# Patient Record
Sex: MALE | Race: WHITE | NOT HISPANIC OR LATINO | Employment: FULL TIME | URBAN - METROPOLITAN AREA
[De-identification: names, ages, dates, MRNs, and addresses within clinical notes are randomized per-mention and may not be internally consistent; named-entity substitution may affect disease eponyms.]

---

## 2017-01-26 DIAGNOSIS — F90.0 ATTENTION DEFICIT HYPERACTIVITY DISORDER (ADHD), PREDOMINANTLY INATTENTIVE TYPE: Primary | ICD-10-CM

## 2017-01-26 DIAGNOSIS — F41.9 ANXIETY: ICD-10-CM

## 2017-01-26 NOTE — TELEPHONE ENCOUNTER
Adderall      Last Written Prescription Date: 12/30/16  Last Fill Quantity: 30,  # refills: 0   Last Office Visit with [facility] or [facility] prescribing provider: 4/11/16                                             Lorazepam      Last Written Prescription Date: 12/30/16  Last Fill Quantity: 20,  # refills: 0   Last Office Visit with [facility], [facility] or [facility] prescribing provider: 4/11/16

## 2017-01-27 RX ORDER — DEXTROAMPHETAMINE SACCHARATE, AMPHETAMINE ASPARTATE MONOHYDRATE, DEXTROAMPHETAMINE SULFATE AND AMPHETAMINE SULFATE 7.5; 7.5; 7.5; 7.5 MG/1; MG/1; MG/1; MG/1
30 CAPSULE, EXTENDED RELEASE ORAL DAILY
Qty: 30 CAPSULE | Refills: 0 | Status: SHIPPED | OUTPATIENT
Start: 2017-01-27 | End: 2017-05-03

## 2017-01-27 RX ORDER — LORAZEPAM 0.5 MG/1
0.5 TABLET ORAL
Qty: 20 TABLET | Refills: 0 | Status: SHIPPED | OUTPATIENT
Start: 2017-01-27 | End: 2017-05-03

## 2017-02-27 DIAGNOSIS — F90.0 ATTENTION DEFICIT HYPERACTIVITY DISORDER (ADHD), PREDOMINANTLY INATTENTIVE TYPE: ICD-10-CM

## 2017-02-27 DIAGNOSIS — F41.9 ANXIETY: ICD-10-CM

## 2017-02-28 NOTE — TELEPHONE ENCOUNTER
Adderall XR 30mg      Last Written Prescription Date: 1/27/2017  Last Fill Quantity: 30,  # refills: 0   Last Office Visit with [facility], [facility] or [facility] prescribing provider: 4/11/2016    Please bring signed prescription to [facility] if approved.    Thank you,  Kristin Don, [facility]

## 2017-02-28 NOTE — TELEPHONE ENCOUNTER
Lorazepam 0.5mg      Last Written Prescription Date: 1/27/2017  Last Fill Quantity: 20,  # refills: 0   Last Office Visit with [facility], [facility] or [facility] prescribing provider: 4/11/2016    Please fax or bring signed prescription to [facility].    Thank you,  Kristin Don [facility]

## 2017-03-01 ENCOUNTER — TELEPHONE (OUTPATIENT)
Dept: FAMILY MEDICINE | Facility: CLINIC | Age: 27
End: 2017-03-01

## 2017-03-01 PROBLEM — Z91.148 VIOLATION OF CONTROLLED SUBSTANCE AGREEMENT: Status: ACTIVE | Noted: 2017-03-01

## 2017-03-01 RX ORDER — LORAZEPAM 0.5 MG/1
0.5 TABLET ORAL
Qty: 20 TABLET | Refills: 0 | OUTPATIENT
Start: 2017-03-01

## 2017-03-01 RX ORDER — DEXTROAMPHETAMINE SACCHARATE, AMPHETAMINE ASPARTATE MONOHYDRATE, DEXTROAMPHETAMINE SULFATE AND AMPHETAMINE SULFATE 7.5; 7.5; 7.5; 7.5 MG/1; MG/1; MG/1; MG/1
30 CAPSULE, EXTENDED RELEASE ORAL DAILY
Qty: 30 CAPSULE | Refills: 0 | OUTPATIENT
Start: 2017-03-01

## 2017-03-01 NOTE — TELEPHONE ENCOUNTER
Left VM for patient to call back to notify him that Dr. Peterson will not be filling any controlled substances for him due to his recent snf stay for illegal sale of narcotics and illicit drugs. KB/CMA

## 2017-05-03 ENCOUNTER — OFFICE VISIT (OUTPATIENT)
Dept: FAMILY MEDICINE | Facility: CLINIC | Age: 27
End: 2017-05-03
Payer: COMMERCIAL

## 2017-05-03 VITALS
HEIGHT: 72 IN | TEMPERATURE: 97.3 F | WEIGHT: 206 LBS | DIASTOLIC BLOOD PRESSURE: 72 MMHG | SYSTOLIC BLOOD PRESSURE: 122 MMHG | HEART RATE: 87 BPM | RESPIRATION RATE: 18 BRPM | OXYGEN SATURATION: 99 % | BODY MASS INDEX: 27.9 KG/M2

## 2017-05-03 DIAGNOSIS — F90.0 ATTENTION DEFICIT HYPERACTIVITY DISORDER (ADHD), PREDOMINANTLY INATTENTIVE TYPE: Primary | ICD-10-CM

## 2017-05-03 PROCEDURE — 99213 OFFICE O/P EST LOW 20 MIN: CPT | Performed by: FAMILY MEDICINE

## 2017-05-03 RX ORDER — ATOMOXETINE 40 MG/1
40 CAPSULE ORAL DAILY
Qty: 30 CAPSULE | Refills: 1 | Status: SHIPPED | OUTPATIENT
Start: 2017-05-03 | End: 2024-06-11

## 2017-05-03 ASSESSMENT — PAIN SCALES - GENERAL: PAINLEVEL: NO PAIN (0)

## 2017-05-03 NOTE — PROGRESS NOTES
SUBJECTIVE:                                                    Jeffery Red is a 27 year old male who presents to clinic today for the following health issues:      ADHD     Amount of exercise or physical activity: 6-7 days/week for an average of 30-45 minutes    Problems taking medications regularly: No    Medication side effects: none    Diet: regular (no restrictions)          Problem list and histories reviewed & adjusted, as indicated.  Additional history: as documented        Reviewed and updated as needed this visit by clinical staff       Reviewed and updated as needed this visit by Provider        SUBJECTIVE:  Jeffery  is a 27 year old male who presents for:  Getting back on something for his ADHD. He has been on something since his early teens. He has been off on occasion because he didn't have insurance. He was recently in prison for illegal selling narcotics. He is in a [employer] house right now. He is being evaluated in the coming weeks by [place]. It is felt he needs to get back on something and of course not anything that is controlled substance. He feels his main concern is not able to focus. States his mind is always reeling.    Past Medical History:   Diagnosis Date     ADHD (attention deficit hyperactivity disorder)      Boxer's fracture     right 5th MC - pt did not follow up but it healed and he has good function     Metacarpal bone fracture 11/3/2011     Past Surgical History:   Procedure Laterality Date     IRRIGATION AND DEBRIDEMENT FINGER, COMBINED  11/4/2011    Procedure:COMBINED IRRIGATION AND DEBRIDEMENT FINGER; right middle finger irrigation and debridement; Surgeon:PHONG BRODERICK; Location: OR     Social History   Substance Use Topics     Smoking status: Current Every Day Smoker     Packs/day: 1.00     Smokeless tobacco: Not on file     Alcohol use No     Current Outpatient Prescriptions   Medication Sig Dispense Refill     atomoxetine (STRATTERA) 40 MG capsule Take 1 capsule  "(40 mg) by mouth daily 30 capsule 1     Acetaminophen (TYLENOL PO) Take 325 mg by mouth Reported on 5/3/2017       IBUPROFEN PO Take 600 mg by mouth Reported on 5/3/2017         REVIEW OF SYSTEMS:   5 point ROS negative except as noted above in HPI, including Gen., Resp, CV, GI &  system review.     OBJECTIVE:  Vitals: /72  Pulse 87  Temp 97.3  F (36.3  C) (Tympanic)  Resp 18  Ht 6' 0.25\" (1.835 m)  Wt 206 lb (93.4 kg)  SpO2 99%  BMI 27.75 kg/m2  BMI= Body mass index is 27.75 kg/(m^2).  He appears in no distress. Good eye contact seems a bit on edge.    ASSESSMENT:  ADHD    PLAN:  We'll start with Strattera 40 mg a day. Discussed potential side effects. Told him it may take a while to take effect. Should follow back in about a month.        Luciano Peterson MD  [facility]              "

## 2017-05-03 NOTE — MR AVS SNAPSHOT
"              After Visit Summary   5/3/2017    Jeffery Red    MRN: [id number]           Patient Information     Date Of Birth          1990        Visit Information        Provider Department      5/3/2017 1:00 PM Luciano Peterson MD [facility]        Today's Diagnoses     Attention deficit hyperactivity disorder (ADHD), predominantly inattentive type    -  1       Follow-ups after your visit        Who to contact     If you have questions or need follow up information about today's clinic visit or your schedule please contact [facility] directly at [phone].  Normal or non-critical lab and imaging results will be communicated to you by "[employer]hart, letter or phone within 4 business days after the clinic has received the results. If you do not hear from us within 7 days, please contact the clinic through "[employer]hart or phone. If you have a critical or abnormal lab result, we will notify you by phone as soon as possible.  Submit refill requests through [employer] or call your pharmacy and they will forward the refill request to us. Please allow 3 business days for your refill to be completed.          Additional Information About Your Visit        MyChart Information     [employer] lets you send messages to your doctor, view your test results, renew your prescriptions, schedule appointments and more. To sign up, go to [email or web address] . Click on \"Log in\" on the left side of the screen, which will take you to the Welcome page. Then click on \"Sign up Now\" on the right side of the page.     You will be asked to enter the access code listed below, as well as some personal information. Please follow the directions to create your username and password.     Your access code is: [email or web address]  Expires: 2017  1:25 PM     Your access code will  in 90 days. If you need help or a new code, please call your [facility] or [phone].        Care EveryWhere ID     This is your " "Care EveryWhere ID. This could be used by other organizations to access your [place] medical records  [id number]        Your Vitals Were     Pulse Temperature Respirations Height Pulse Oximetry BMI (Body Mass Index)    87 97.3  F (36.3  C) (Tympanic) 18 6' 0.25\" (1.835 m) 99% 27.75 kg/m2       Blood Pressure from Last 3 Encounters:   05/03/17 122/72   12/06/16 (!) 167/97   10/22/16 138/76    Weight from Last 3 Encounters:   05/03/17 206 lb (93.4 kg)   12/06/16 185 lb (83.9 kg)   04/11/16 207 lb (93.9 kg)              Today, you had the following     No orders found for display         Today's Medication Changes          These changes are accurate as of: 5/3/17  2:18 PM.  If you have any questions, ask your nurse or doctor.               Start taking these medicines.        Dose/Directions    atomoxetine 40 MG capsule   Commonly known as:  STRATTERA   Used for:  Attention deficit hyperactivity disorder (ADHD), predominantly inattentive type   Started by:  Luciano Peterson MD        Dose:  40 mg   Take 1 capsule (40 mg) by mouth daily   Quantity:  30 capsule   Refills:  1            Where to get your medicines      These medications were sent to [facility], [address] [place] [facility] [address] [facility] [address]     Phone:  [phone]     atomoxetine 40 MG capsule                Primary Care Provider Office Phone # Fax #    Luciano Peterson [phone]       [facility]   [facility] [place] [address]        Thank you!     Thank you for choosing [facility]  for your care. Our goal is always to provide you with excellent care. Hearing back from our patients is one way we can continue to improve our services. Please take a few minutes to complete the written survey that you may receive in the mail after your visit with us. Thank you!             Your Updated Medication List - Protect others around you: Learn how to safely " use, store and throw away your medicines at www.disposemymeds.org.          This list is accurate as of: 5/3/17  2:18 PM.  Always use your most recent med list.                   Brand Name Dispense Instructions for use    atomoxetine 40 MG capsule    STRATTERA    30 capsule    Take 1 capsule (40 mg) by mouth daily       IBUPROFEN PO      Take 600 mg by mouth Reported on 5/3/2017       TYLENOL PO      Take 325 mg by mouth Reported on 5/3/2017

## 2017-05-03 NOTE — NURSING NOTE
"Chief Complaint   Patient presents with     A.D.H.D     recheck meds        Initial /72  Pulse 87  Temp 97.3  F (36.3  C) (Tympanic)  Resp 18  Ht 6' 0.25\" (1.835 m)  Wt 206 lb (93.4 kg)  SpO2 99%  BMI 27.75 kg/m2 Estimated body mass index is 27.75 kg/(m^2) as calculated from the following:    Height as of this encounter: 6' 0.25\" (1.835 m).    Weight as of this encounter: 206 lb (93.4 kg).  BP completed using cuff size: shari Price MA      "

## 2018-02-21 ENCOUNTER — TELEPHONE (OUTPATIENT)
Dept: FAMILY MEDICINE | Facility: CLINIC | Age: 28
End: 2018-02-21

## 2018-02-21 NOTE — TELEPHONE ENCOUNTER
adderall and lorazepam - pt states been in retirement and needs these filled- didnt see anything on his med list

## 2018-02-21 NOTE — TELEPHONE ENCOUNTER
Per Dr. Peterson: He will not be prescribing these medications to the patient.  He had discussed this with the patient last spring when he had last seen him. The only thing he agreed to fill at that time was Strattera. Left  for return call. Please relay message to patient.

## 2018-02-21 NOTE — TELEPHONE ENCOUNTER
Patient called back and info was given.  Thank you,  Renu Crenshaw   for [facility]

## 2018-02-28 ENCOUNTER — OFFICE VISIT (OUTPATIENT)
Dept: FAMILY MEDICINE | Facility: CLINIC | Age: 28
End: 2018-02-28
Payer: MEDICAID

## 2018-02-28 VITALS
HEIGHT: 72 IN | BODY MASS INDEX: 27.21 KG/M2 | OXYGEN SATURATION: 97 % | TEMPERATURE: 98.6 F | WEIGHT: 200.9 LBS | DIASTOLIC BLOOD PRESSURE: 86 MMHG | SYSTOLIC BLOOD PRESSURE: 138 MMHG | HEART RATE: 105 BPM

## 2018-02-28 DIAGNOSIS — R41.840 POOR CONCENTRATION: ICD-10-CM

## 2018-02-28 DIAGNOSIS — F41.9 ANXIETY: Primary | ICD-10-CM

## 2018-02-28 PROCEDURE — 99213 OFFICE O/P EST LOW 20 MIN: CPT | Performed by: FAMILY MEDICINE

## 2018-02-28 RX ORDER — CITALOPRAM HYDROBROMIDE 20 MG/1
20 TABLET ORAL DAILY
Qty: 30 TABLET | Refills: 1 | Status: CANCELLED | OUTPATIENT
Start: 2018-02-28

## 2018-02-28 NOTE — MR AVS SNAPSHOT
After Visit Summary   2/28/2018    Jeffery Red    MRN: [id number]           Patient Information     Date Of Birth          1990        Visit Information        Provider Department      2/28/2018 2:20 PM Luciano Peterson MD [facility]        Today's Diagnoses     Anxiety    -  1    Poor concentration           Follow-ups after your visit        Additional Services     MENTAL HEALTH REFERRAL  - Adult; Assessments and Testing; General Psychological Testing; Other: [facility] [phone]; We will contact you to schedule the appointment or please call with any questions       ESME NAYLOR    All scheduling is subject to the client's specific insurance plan & benefits, provider/location availability, and provider clinical specialities.  Please arrive 15 minutes early for your first appointment and bring your completed paperwork.    Please be aware that coverage of these services is subject to the terms and limitations of your health insurance plan.  Call member services at your health plan with any benefit or coverage questions.                  Who to contact     If you have questions or need follow up information about today's clinic visit or your schedule please contact [facility] directly at [phone].  Normal or non-critical lab and imaging results will be communicated to you by [employer], letter or phone within 4 business days after the clinic has received the results. If you do not hear from us within 7 days, please contact the clinic through [employer] or phone. If you have a critical or abnormal lab result, we will notify you by phone as soon as possible.  Submit refill requests through [employer] or call your pharmacy and they will forward the refill request to us. Please allow 3 business days for your refill to be completed.          Additional Information About Your Visit        [employer] Information     [employer] lets you send  "messages to your doctor, view your test results, renew your prescriptions, schedule appointments and more. To sign up, go to [email or web address] . Click on \"Log in\" on the left side of the screen, which will take you to the Welcome page. Then click on \"Sign up Now\" on the right side of the page.     You will be asked to enter the access code listed below, as well as some personal information. Please follow the directions to create your username and password.     Your access code is: [email or web address]  Expires: 2018  3:00 PM     Your access code will  in 90 days. If you need help or a new code, please call your [place] clinic or [phone].        Care EveryWhere ID     This is your Care EveryWhere ID. This could be used by other organizations to access your [place] medical records  [id number]        Your Vitals Were     Pulse Temperature Height Pulse Oximetry BMI (Body Mass Index)       105 98.6  F (37  C) (Temporal) 6' 0.25\" (1.835 m) 97% 27.06 kg/m2        Blood Pressure from Last 3 Encounters:   18 138/86   17 122/72   16 (!) 167/97    Weight from Last 3 Encounters:   18 200 lb 14.4 oz (91.1 kg)   17 206 lb (93.4 kg)   16 185 lb (83.9 kg)              We Performed the Following     MENTAL HEALTH REFERRAL  - Adult; Assessments and Testing; General Psychological Testing; Other: [facility] [phone]; We will contact you to schedule the appointment or please call with any questions        Primary Care Provider Office Phone # Fax #    Luciano Peterson [phone]       [facility] [address] [place] [address]        Equal Access to Services     TIM MARTINEZ : Lane Rivas, pepper garcia, suhdeer dowling. So [facility] [phone].    ATENCIÓN: Si habla español, tiene a vargas disposición servicios gratuitos de asistencia lingüística. " Eliel galdamez [phone].    We comply with applicable federal civil rights laws and [place] laws. We do not discriminate on the basis of race, color, national origin, age, disability, sex, sexual orientation, or gender identity.            Thank you!     Thank you for choosing [facility]  for your care. Our goal is always to provide you with excellent care. Hearing back from our patients is one way we can continue to improve our services. Please take a few minutes to complete the written survey that you may receive in the mail after your visit with us. Thank you!             Your Updated Medication List - Protect others around you: Learn how to safely use, store and throw away your medicines at www.disposemymeds.org.          This list is accurate as of 2/28/18  3:00 PM.  Always use your most recent med list.                   Brand Name Dispense Instructions for use Diagnosis    atomoxetine 40 MG capsule    STRATTERA    30 capsule    Take 1 capsule (40 mg) by mouth daily    Attention deficit hyperactivity disorder (ADHD), predominantly inattentive type       IBUPROFEN PO      Take 600 mg by mouth Reported on 5/3/2017        TYLENOL PO      Take 325 mg by mouth Reported on 5/3/2017

## 2018-02-28 NOTE — PROGRESS NOTES
SUBJECTIVE:   Jeffery Red is a 28 year old male who presents to clinic today for the following health issues:      Concern - Restart adderall and lorazepam.             Problem list and histories reviewed & adjusted, as indicated.  Additional history: as documented        Reviewed and updated as needed this visit by clinical staff       Reviewed and updated as needed this visit by Provider          SUBJECTIVE:  Jeffery  is a 28 year old male who presents for: Concerns about ADHD.  He wants to get back on Adderall and he was on Lorazepam.  These were discontinued over a year ago.  He was imprisoned a year ago for sale of narcotics and illegal drugs.  States he was fighting this charge.  He was in a detention house again and they would not allow him to have this.  Last May I gave him Strattera.  Apparently this did not work I never saw him back.  He states he is had ADHD diagnosed when he was in high school.  He was just started on Adderall and do not believe he had a formal diagnosis done by mental health professional..    Past Medical History:   Diagnosis Date     ADHD (attention deficit hyperactivity disorder)      Boxer's fracture     right 5th MC - pt did not follow up but it healed and he has good function     Metacarpal bone fracture 11/3/2011     Past Surgical History:   Procedure Laterality Date     IRRIGATION AND DEBRIDEMENT FINGER, COMBINED  11/4/2011    Procedure:COMBINED IRRIGATION AND DEBRIDEMENT FINGER; right middle finger irrigation and debridement; Surgeon:PHONG BRODERICK; Location: OR     Social History   Substance Use Topics     Smoking status: Current Every Day Smoker     Packs/day: 0.75     Smokeless tobacco: Never Used     Alcohol use No     Current Outpatient Prescriptions   Medication Sig Dispense Refill     atomoxetine (STRATTERA) 40 MG capsule Take 1 capsule (40 mg) by mouth daily (Patient not taking: Reported on 2/28/2018) 30 capsule 1     Acetaminophen (TYLENOL PO) Take 325 mg by  "mouth Reported on 5/3/2017       IBUPROFEN PO Take 600 mg by mouth Reported on 5/3/2017         REVIEW OF SYSTEMS:   5 point ROS negative except as noted above in HPI, including Gen., Resp, CV, GI &  system review.     OBJECTIVE:  Vitals: /86 (BP Location: Right arm, Patient Position: Chair, Cuff Size: Adult Large)  Pulse 105  Temp 98.6  F (37  C) (Temporal)  Ht 6' 0.25\" (1.835 m)  Wt 200 lb 14.4 oz (91.1 kg)  SpO2 97%  BMI 27.06 kg/m2  BMI= Body mass index is 27.06 kg/(m^2).  He is alert and oriented.  Is somewhat disgruntled with our discussion.  States he needs something.    ASSESSMENT:  Anxiety    PLAN:  We had documentation from public records that he was charged with sale of narcotics and illegal substances from March 1 last year.  He was in [facility] [employer].  I pointed out to him that he has not been on anything for this for over a year.  As far as I know is not in any school right now but he states he has trouble concentrating.  I told him there are other medications for this and suggested maybe an SSRI to help with his anxiety.  He does not want to do this.  I suggested he get a formal workup to see just what it is that we are dealing with here.  He is agreeable to this.  I told him I am not comfortable prescribing controlled substances to someone with charges like this on his record.        Luciano Peterson MD  [facility]              "

## 2018-02-28 NOTE — NURSING NOTE
"Chief Complaint   Patient presents with     Medication Request     medication refill        Initial /86 (BP Location: Right arm, Patient Position: Chair, Cuff Size: Adult Large)  Pulse 105  Temp 98.6  F (37  C) (Temporal)  Ht 6' 0.25\" (1.835 m)  Wt 200 lb 14.4 oz (91.1 kg)  SpO2 97%  BMI 27.06 kg/m2 Estimated body mass index is 27.06 kg/(m^2) as calculated from the following:    Height as of this encounter: 6' 0.25\" (1.835 m).    Weight as of this encounter: 200 lb 14.4 oz (91.1 kg).  Medication Reconciliation: complete     "

## 2019-05-08 ENCOUNTER — APPOINTMENT (OUTPATIENT)
Dept: GENERAL RADIOLOGY | Facility: CLINIC | Age: 29
End: 2019-05-08
Attending: NURSE PRACTITIONER

## 2019-05-08 ENCOUNTER — HOSPITAL ENCOUNTER (EMERGENCY)
Facility: CLINIC | Age: 29
Discharge: HOME | End: 2019-05-08
Attending: NURSE PRACTITIONER | Admitting: NURSE PRACTITIONER

## 2019-05-08 VITALS
TEMPERATURE: 98.6 F | RESPIRATION RATE: 16 BRPM | WEIGHT: 230 LBS | DIASTOLIC BLOOD PRESSURE: 68 MMHG | BODY MASS INDEX: 30.98 KG/M2 | OXYGEN SATURATION: 97 % | HEART RATE: 77 BPM | SYSTOLIC BLOOD PRESSURE: 120 MMHG

## 2019-05-08 DIAGNOSIS — J11.1 INFLUENZA-LIKE ILLNESS: ICD-10-CM

## 2019-05-08 LAB
ALBUMIN SERPL-MCNC: 4.3 G/DL (ref 3.4–5)
ALP SERPL-CCNC: 123 U/L (ref 40–150)
ALT SERPL W P-5'-P-CCNC: 35 U/L (ref 0–70)
ANION GAP SERPL CALCULATED.3IONS-SCNC: 10 MMOL/L (ref 3–14)
AST SERPL W P-5'-P-CCNC: 22 U/L (ref 0–45)
BASOPHILS # BLD AUTO: 0 10E9/L (ref 0–0.2)
BASOPHILS NFR BLD AUTO: 0.1 %
BILIRUB SERPL-MCNC: 1 MG/DL (ref 0.2–1.3)
BUN SERPL-MCNC: 15 MG/DL (ref 7–30)
CALCIUM SERPL-MCNC: 9 MG/DL (ref 8.5–10.1)
CHLORIDE SERPL-SCNC: 108 MMOL/L (ref 94–109)
CO2 SERPL-SCNC: 23 MMOL/L (ref 20–32)
CREAT SERPL-MCNC: 0.87 MG/DL (ref 0.66–1.25)
DIFFERENTIAL METHOD BLD: ABNORMAL
EOSINOPHIL NFR BLD AUTO: 1.8 %
ERYTHROCYTE [DISTWIDTH] IN BLOOD BY AUTOMATED COUNT: 12.3 % (ref 10–15)
GFR SERPL CREATININE-BSD FRML MDRD: >90 ML/MIN/{1.73_M2}
GLUCOSE SERPL-MCNC: 87 MG/DL (ref 70–99)
HCT VFR BLD AUTO: 42.6 % (ref 40–53)
HGB BLD-MCNC: 14.9 G/DL (ref 13.3–17.7)
IMM GRANULOCYTES # BLD: 0.1 10E9/L (ref 0–0.4)
IMM GRANULOCYTES NFR BLD: 0.3 %
LACTATE BLD-SCNC: 0.8 MMOL/L (ref 0.7–2)
LYMPHOCYTES # BLD AUTO: 1.1 10E9/L (ref 0.8–5.3)
LYMPHOCYTES NFR BLD AUTO: 7 %
MCH RBC QN AUTO: 30.2 PG (ref 26.5–33)
MCHC RBC AUTO-ENTMCNC: 35 G/DL (ref 31.5–36.5)
MCV RBC AUTO: 86 FL (ref 78–100)
MONOCYTES # BLD AUTO: 1.2 10E9/L (ref 0–1.3)
MONOCYTES NFR BLD AUTO: 7.3 %
NEUTROPHILS # BLD AUTO: 13.2 10E9/L (ref 1.6–8.3)
NEUTROPHILS NFR BLD AUTO: 83.5 %
NRBC # BLD AUTO: 0 10*3/UL
NRBC BLD AUTO-RTO: 0 /100
PLATELET # BLD AUTO: 266 10E9/L (ref 150–450)
POTASSIUM SERPL-SCNC: 3.7 MMOL/L (ref 3.4–5.3)
PROT SERPL-MCNC: 7.7 G/DL (ref 6.8–8.8)
RBC # BLD AUTO: 4.93 10E12/L (ref 4.4–5.9)
SODIUM SERPL-SCNC: 141 MMOL/L (ref 133–144)
WBC # BLD AUTO: 15.9 10E9/L (ref 4–11)

## 2019-05-08 PROCEDURE — 25000128 H RX IP 250 OP 636: Performed by: NURSE PRACTITIONER

## 2019-05-08 PROCEDURE — 80053 COMPREHEN METABOLIC PANEL: CPT | Performed by: FAMILY MEDICINE

## 2019-05-08 PROCEDURE — 99284 EMERGENCY DEPT VISIT MOD MDM: CPT | Mod: 25 | Performed by: NURSE PRACTITIONER

## 2019-05-08 PROCEDURE — 85025 COMPLETE CBC W/AUTO DIFF WBC: CPT | Performed by: FAMILY MEDICINE

## 2019-05-08 PROCEDURE — 99284 EMERGENCY DEPT VISIT MOD MDM: CPT | Mod: Z6 | Performed by: NURSE PRACTITIONER

## 2019-05-08 PROCEDURE — 71046 X-RAY EXAM CHEST 2 VIEWS: CPT | Mod: TC

## 2019-05-08 PROCEDURE — 83605 ASSAY OF LACTIC ACID: CPT | Performed by: FAMILY MEDICINE

## 2019-05-08 PROCEDURE — 96374 THER/PROPH/DIAG INJ IV PUSH: CPT | Performed by: NURSE PRACTITIONER

## 2019-05-08 PROCEDURE — 96375 TX/PRO/DX INJ NEW DRUG ADDON: CPT | Performed by: NURSE PRACTITIONER

## 2019-05-08 RX ORDER — CODEINE PHOSPHATE AND GUAIFENESIN 10; 100 MG/5ML; MG/5ML
1-2 SOLUTION ORAL
Qty: 180 ML | Refills: 0 | Status: SHIPPED | OUTPATIENT
Start: 2019-05-08 | End: 2024-06-11

## 2019-05-08 RX ORDER — ONDANSETRON 2 MG/ML
4 INJECTION INTRAMUSCULAR; INTRAVENOUS EVERY 30 MIN PRN
Status: DISCONTINUED | OUTPATIENT
Start: 2019-05-08 | End: 2019-05-09 | Stop reason: HOSPADM

## 2019-05-08 RX ORDER — KETOROLAC TROMETHAMINE 30 MG/ML
30 INJECTION, SOLUTION INTRAMUSCULAR; INTRAVENOUS ONCE
Status: COMPLETED | OUTPATIENT
Start: 2019-05-08 | End: 2019-05-08

## 2019-05-08 RX ORDER — LIDOCAINE 40 MG/G
CREAM TOPICAL
Status: DISCONTINUED | OUTPATIENT
Start: 2019-05-08 | End: 2019-05-08

## 2019-05-08 RX ADMIN — SODIUM CHLORIDE 1000 ML: 9 INJECTION, SOLUTION INTRAVENOUS at 21:57

## 2019-05-08 RX ADMIN — ONDANSETRON 4 MG: 2 INJECTION INTRAMUSCULAR; INTRAVENOUS at 21:58

## 2019-05-08 RX ADMIN — KETOROLAC TROMETHAMINE 30 MG: 30 INJECTION, SOLUTION INTRAMUSCULAR at 22:01

## 2019-05-08 ASSESSMENT — ENCOUNTER SYMPTOMS
CHILLS: 1
ACTIVITY CHANGE: 1
FEVER: 1
MYALGIAS: 1
DIARRHEA: 1
FATIGUE: 1
NAUSEA: 1
COUGH: 1
APPETITE CHANGE: 1

## 2019-05-08 NOTE — ED AVS SNAPSHOT
[facility]  [address] [place] [address]  Phone:  [phone]  Fax:  [phone]                                    Jeffery Red   MRN: [id number]    Department:  [facility]   Date of Visit:  5/8/2019           After Visit Summary Signature Page    I have received my discharge instructions, and my questions have been answered. I have discussed any challenges I see with this plan with the nurse or doctor.    ..........................................................................................................................................  Patient/Patient Representative Signature      ..........................................................................................................................................  Patient Representative Print Name and Relationship to Patient    ..................................................               ................................................  Date                                   Time    ..........................................................................................................................................  Reviewed by Signature/Title    ...................................................              ..............................................  Date                                               Time          22EPIC Rev 08/18

## 2019-05-09 NOTE — ED PROVIDER NOTES
"  History     Chief Complaint   Patient presents with     Cough     Shortness of Breath     HPI  Jeffery Red is a 29 year old male who presents to the ED today with his mom with c/o a cough, lung pain, body aches, nausea, post-tussive emesis, non bloody diarrhea, and generally not feeling well since yesterday.  Patient has been taking Nyquil and Dayquil without much relief in his symptoms.  Patient has been drinking some water today but \"not enough\".  Patient has not been eating, is a smoker, did not get a flu shot this year.     Allergies:  Allergies   Allergen Reactions     Hydromorphone Hives     Facial swelling     Dilaudid [Hydromorphone Hcl]      Symptoms: itching, hives (all 4 extremities), swelling on face and under tongue. Occurred right after Dilaudid IV given-      probable histamine reaction due to narcotic per pharmacist (pt was given Zantac and Benadryl with resolution of symptoms in 10 min. Pharmacist reports not a true allergy with such a quick resolution of the symptoms after treated with Zantac and Benedryl)     Vancomycin Hives     Facial swelling       Problem List:    Patient Active Problem List    Diagnosis Date Noted     Health Care Home 05/01/2012     Priority: High     Patient Care Plan  Emergency Treatment Plan    Has the Patient been seen in the ED 4 or more times in the last 12 months : Yes    Recent Treatments: Please review Epic for recent treatments    Patient/PCP Contract: None    Patient Acknowledgement of Plan of Care: No     Presenting Problem Treatment Plan and Discharge Instructions   Multiple ED visits but UT for any outreaches/Post PCP appts Please note ED visits for bone fractures/injuries. Consider anger/behavior issues and Psyche consult or possibly chem dep?   Minimal contact info provided Please update     The provider seeing you for these problems in the emergency setting may determine that a different course of care is necessary based on the situation.    Team " Communication/Sticky Note: (Take items out when done)  Date Noted Care Team Member TO DO/Alerts to be completed                     Health care home/care coordination was discussed with the patient and he/she agrees to participate in care coordination. The patient was introduced to the care coordinator and given a patient packet to review and complete. The care coordinator will contact the patient to start care planning process.  Care coordination start date:      Frequency of care coordination with care team:  Care Coordinator Name  Contact information for updates to this care plan:  1.  Care Coordinator   Phone #   Fax #  2.  Clinic Unit Coordinator/   Phone #   Fax #  This care plan was discussed and reviewed with Jeffery Red on ____________.  Provider:  Care Coordinator:  Enid Branch RN    SELF MANAGEMENT PLAN  Presenting Problem Signs and Symptoms Treatment Plan                                   Advanced Care Directives:  not on file  Action Plans on File:  None  Short and Long Term Goals  Goals/Issues My Action Plan Person Responsible Time Frame/Date Completed                                             Jeffery Red   [facility] Rec #: [id number]   Health Insurance/Plan:   : 1990   ID: N/A   Primary Care Provider: No primary provider on file.       Date form completed: No visit date found.       Primary Ethnic Culture:  American   Primary Language:   English     needed? No Language: English   Name of preferred :   Phone #:   Preferred Mode of Communication: TBD   Preferred Method of Communication: TBD   Health Care Home Complexity Tier:         Contact Information:   Mother's Name: Suma Red   Father's Name: Data Unavailable   Phone: [phone] (home)    Preferred Contact   Address:    [facility] [address]  [place]   Siblings/Relatives:   Lives with:      My Story  I like to be called Jeffery  Health Maintenance/Preventative Procedures and  Immunizations are addressed in the Health Maintenance List and should be updated  Integrated Medical Plan    Additional Standing Lab Orders (Use Health Maintenance When Possible):      TBD    Therapies:  N/A    My Multidisciplinary Care Team Members  Specialty of Care Team Member Name, Clinic, Address, Phone #, Fax #, E-mail   Primary care provider: No primary provider on file.                          School:  N/A    Care Givers  Type of Care Giver Phone/Fax E-mail   PCA:        Home Health Agency:       Nurse Name:       :       Guardian:         Persons You Can Contact About Me and My Medical Care  Name Relation Phone/Fax E-mail DARIEN Date                                                     This care plan is a documentation of the individual s care as directed by the family, educators, therapists and diverse medical providers.  Contributors to the care plan include the patient, the patient s family and No primary provider on file. and the health care home team at [facility].  Please indicate any changes made to the care of this patient on this care plan and fax it to the care coordinator above.  Thank you for your attention.  Patient: Jeffery Red__________________  Parent:N/A______________________  No primary provider on file.___________________  May 1, 2012___________________      Enid Branch RN   Care Coordinator  [phone]  DX V65.8 REPLACED WITH [id number] HEALTH CARE HOME (04/08/2013)       Violation of controlled substance agreement 03/01/2017     Priority: Medium     Pt was in nursing home for selling his meds and [employer]  drugs       Anxiety 03/16/2016     Priority: Medium     Attention deficit hyperactivity disorder (ADHD), predominantly inattentive type 02/18/2016     Priority: Medium     CARDIOVASCULAR SCREENING; LDL GOAL LESS THAN 130 09/04/2012     Priority: Medium     Metacarpal bone fracture 11/03/2011     Priority: Medium     Right third metacarpal          Past Medical  History:    Past Medical History:   Diagnosis Date     ADHD (attention deficit hyperactivity disorder)      Boxer's fracture      Metacarpal bone fracture 11/3/2011       Past Surgical History:    Past Surgical History:   Procedure Laterality Date     IRRIGATION AND DEBRIDEMENT FINGER, COMBINED  11/4/2011    Procedure:COMBINED IRRIGATION AND DEBRIDEMENT FINGER; right middle finger irrigation and debridement; Surgeon:PHONG BRODERICK; Location:[facility] OR       Family History:    No family history on file.    Social History:  Marital Status:  Single [1]  Social History     Tobacco Use     Smoking status: Current Every Day Smoker     Packs/day: 0.75     Smokeless tobacco: Never Used   Substance Use Topics     Alcohol use: No     Drug use: No     Comment: Sober date 7/5/ 2014        Medications:      Acetaminophen (TYLENOL PO)   atomoxetine (STRATTERA) 40 MG capsule   IBUPROFEN PO         Review of Systems   Constitutional: Positive for activity change, appetite change, chills, fatigue and fever.   Respiratory: Positive for cough.    Gastrointestinal: Positive for diarrhea and nausea.   Musculoskeletal: Positive for myalgias.   All other systems reviewed and are negative.      Physical Exam   BP: (!) 125/96  Pulse: 101  Temp: 99  F (37.2  C)  Resp: 22  Weight: 104.3 kg (230 lb)  SpO2: 95 %      Physical Exam   Constitutional: He appears well-developed and well-nourished. No distress.   HENT:   Head: Normocephalic.   Modestly dry mucus membranes   Eyes: Conjunctivae and EOM are normal.   Neck: Normal range of motion. Neck supple.   Cardiovascular: Regular rhythm.   Mildly tachycardic   Pulmonary/Chest: Effort normal. No respiratory distress. He exhibits no tenderness.   Rhonchi RLL   Abdominal: Soft. Bowel sounds are normal.   Neurological: He is alert.   Skin: Capillary refill takes less than 2 seconds. He is not diaphoretic.   Hot to touch   Psychiatric: He has a normal mood and affect.       ED Course        Procedures        Results for orders placed or performed during the hospital encounter of 05/08/19 (from the past 24 hour(s))   Comprehensive metabolic panel   Result Value Ref Range    Sodium 141 133 - 144 mmol/L    Potassium 3.7 3.4 - 5.3 mmol/L    Chloride 108 94 - 109 mmol/L    Carbon Dioxide 23 20 - 32 mmol/L    Anion Gap 10 3 - 14 mmol/L    Glucose 87 70 - 99 mg/dL    Urea Nitrogen 15 7 - 30 mg/dL    Creatinine 0.87 0.66 - 1.25 mg/dL    GFR Estimate >90 >60 mL/min/[1.73_m2]    GFR Estimate If Black >90 >60 mL/min/[1.73_m2]    Calcium 9.0 8.5 - 10.1 mg/dL    Bilirubin Total 1.0 0.2 - 1.3 mg/dL    Albumin 4.3 3.4 - 5.0 g/dL    Protein Total 7.7 6.8 - 8.8 g/dL    Alkaline Phosphatase 123 40 - 150 U/L    ALT 35 0 - 70 U/L    AST 22 0 - 45 U/L   CBC with platelets differential   Result Value Ref Range    WBC 15.9 (H) 4.0 - 11.0 10e9/L    RBC Count 4.93 4.4 - 5.9 10e12/L    Hemoglobin 14.9 13.3 - 17.7 g/dL    Hematocrit 42.6 40.0 - 53.0 %    MCV 86 78 - 100 fl    MCH 30.2 26.5 - 33.0 pg    MCHC 35.0 31.5 - 36.5 g/dL    RDW 12.3 10.0 - 15.0 %    Platelet Count 266 150 - 450 10e9/L    Diff Method Automated Method     % Neutrophils 83.5 %    % Lymphocytes 7.0 %    % Monocytes 7.3 %    % Eosinophils 1.8 %    % Basophils 0.1 %    % Immature Granulocytes 0.3 %    Nucleated RBCs 0 0 /100    Absolute Neutrophil 13.2 (H) 1.6 - 8.3 10e9/L    Absolute Lymphocytes 1.1 0.8 - 5.3 10e9/L    Absolute Monocytes 1.2 0.0 - 1.3 10e9/L    Absolute Basophils 0.0 0.0 - 0.2 10e9/L    Abs Immature Granulocytes 0.1 0 - 0.4 10e9/L    Absolute Nucleated RBC 0.0    Lactate for Sepsis Protocol   Result Value Ref Range    Lactate for Sepsis Protocol 0.8 0.7 - 2.0 mmol/L   XR Chest 2 Views    Narrative    XR CHEST 2 VW 5/8/2019 9:42 PM     HISTORY: cough, fever      Impression    IMPRESSION: Negative exam.    HANSA RONQUILLO MD       Medications   sodium chloride (PF) 0.9% PF flush 3 mL (has no administration in time range)   sodium chloride (PF) 0.9% PF flush  3 mL (has no administration in time range)   ondansetron (ZOFRAN) injection 4 mg (4 mg Intravenous Given 5/8/19 2158)   0.9% sodium chloride BOLUS (1,000 mLs Intravenous New Bag 5/8/19 2157)   ketorolac (TORADOL) injection 30 mg (30 mg Intravenous Given 5/8/19 2201)       Assessments & Plan (with Medical Decision Making)  Influenza like illness  Patient feeling much better here after IV Toradol and fluids.  CXR negative for any lobar infiltrate.  CBC returns with WBC of 15.9.  CMP returns within normal limits.  Lactic acid returns normal at 0.8.  Patient would like to go home to eat and go to bed, we discussed ongoing supportive care in detail as well as reasons to return to the ED.    Patient agreeable to plan of care and discharged in stable condition.      I have reviewed the nursing notes.    I have reviewed the findings, diagnosis, plan and need for follow up with the patient.       Medication List      Started    guaiFENesin-codeine 100-10 MG/5ML solution  Commonly known as:  ROBITUSSIN AC  1-2 tsp., Oral, AT BEDTIME PRN            Final diagnoses:   Influenza-like illness       5/8/2019   [facility] EMERGENCY DEPARTMENT     Jo Ann Gaspar, ROSI CNP  05/08/19 8120

## 2019-05-09 NOTE — DISCHARGE INSTRUCTIONS
Ibuprofen 600 mg every 6 hours as needed for aches/pains/fever, you can take the next dose at 4 AM.  Robitussin AC at bedtime to help you sleep, this does have codeine, so do not take Nyquil with this or drive.   You can take up to 4,000 mg of Tylenol (Acetaminophen) daily from all sources including Dayquil/Nyquil.  Make sure you stay really well hydrated.

## 2020-08-28 ENCOUNTER — HOSPITAL ENCOUNTER (EMERGENCY)
Facility: CLINIC | Age: 30
Discharge: HOME | End: 2020-08-28
Attending: PHYSICIAN ASSISTANT | Admitting: PHYSICIAN ASSISTANT
Payer: COMMERCIAL

## 2020-08-28 VITALS
HEART RATE: 95 BPM | WEIGHT: 220.4 LBS | TEMPERATURE: 97.7 F | OXYGEN SATURATION: 97 % | DIASTOLIC BLOOD PRESSURE: 81 MMHG | BODY MASS INDEX: 29.69 KG/M2 | RESPIRATION RATE: 20 BRPM | SYSTOLIC BLOOD PRESSURE: 151 MMHG

## 2020-08-28 DIAGNOSIS — L23.7 CONTACT DERMATITIS DUE TO POISON IVY: ICD-10-CM

## 2020-08-28 PROCEDURE — 99283 EMERGENCY DEPT VISIT LOW MDM: CPT | Performed by: PHYSICIAN ASSISTANT

## 2020-08-28 PROCEDURE — 99283 EMERGENCY DEPT VISIT LOW MDM: CPT | Mod: Z6 | Performed by: PHYSICIAN ASSISTANT

## 2020-08-28 RX ORDER — PREDNISONE 10 MG/1
TABLET ORAL
Qty: 31 TABLET | Refills: 0 | Status: SHIPPED | OUTPATIENT
Start: 2020-08-28 | End: 2024-06-11

## 2020-08-28 RX ORDER — TRIAMCINOLONE ACETONIDE 1 MG/G
CREAM TOPICAL 2 TIMES DAILY
Qty: 80 G | Refills: 0 | Status: SHIPPED | OUTPATIENT
Start: 2020-08-28 | End: 2024-06-11

## 2020-08-28 NOTE — DISCHARGE INSTRUCTIONS
It was a pleasure working with you today!  I hope your condition improves rapidly!     Start the prednisone right away today.  Then take your next dose tomorrow morning with breakfast.  It is best taken in the morning to try and avoid the sleeplessness potential side effect.  Also, it is best to take it with food to avoid any stomach upset.  You can use the triamcinolone cream on the worst spots to try and dry this up.  Calamine lotion can be used over the top.  Benadryl 25 mg every 4-6 hours can be used for itch.  You can also use a nonsedating antihistamine in the form of Zyrtec 10 mg twice daily as needed for itch.

## 2020-08-28 NOTE — ED PROVIDER NOTES
History     Chief Complaint   Patient presents with     Poison Kathy     The history is provided by the patient.      Jeffery Red is a 30 year old male who presents to the emergency department for poison ivy. He noticed a few patches on his arms a couple days ago, but today the rash has spread throughout his body. He took benadryl yesterday and has been using calamine lotion and different over the counter sprays with little relief. He has a history of bad reactions to poison ivy.      Allergies:  Allergies   Allergen Reactions     Hydromorphone Hives     Facial swelling     Dilaudid [Hydromorphone Hcl]      Symptoms: itching, hives (all 4 extremities), swelling on face and under tongue. Occurred right after Dilaudid IV given-      probable histamine reaction due to narcotic per pharmacist (pt was given Zantac and Benadryl with resolution of symptoms in 10 min. Pharmacist reports not a true allergy with such a quick resolution of the symptoms after treated with Zantac and Benedryl)     Vancomycin Hives     Facial swelling       Problem List:    Patient Active Problem List    Diagnosis Date Noted     Health Care Home 05/01/2012     Priority: High     Patient Care Plan  Emergency Treatment Plan    Has the Patient been seen in the ED 4 or more times in the last 12 months : Yes    Recent Treatments: Please review Epic for recent treatments    Patient/PCP Contract: None    Patient Acknowledgement of Plan of Care: No     Presenting Problem Treatment Plan and Discharge Instructions   Multiple ED visits but UT for any outreaches/Post PCP appts Please note ED visits for bone fractures/injuries. Consider anger/behavior issues and Psyche consult or possibly chem dep?   Minimal contact info provided Please update     The provider seeing you for these problems in the emergency setting may determine that a different course of care is necessary based on the situation.    Team Communication/Sticky Note: (Take items out when  done)  Date Noted Care Team Member TO DO/Alerts to be completed                     Health care home/care coordination was discussed with the patient and he/she agrees to participate in care coordination. The patient was introduced to the care coordinator and given a patient packet to review and complete. The care coordinator will contact the patient to start care planning process.  Care coordination start date:      Frequency of care coordination with care team:  Care Coordinator Name  Contact information for updates to this care plan:  1.  Care Coordinator   Phone #   Fax #  2.  Clinic Unit Coordinator/   Phone #   Fax #  This care plan was discussed and reviewed with Jeffery Red on ____________.  Provider:  Care Coordinator:  Enid Branch RN    SELF MANAGEMENT PLAN  Presenting Problem Signs and Symptoms Treatment Plan                                   Advanced Care Directives:  not on file  Action Plans on File:  None  Short and Long Term Goals  Goals/Issues My Action Plan Person Responsible Time Frame/Date Completed                                             Jeffery Red   Med Rec #: [id number]   Health Insurance/Plan:   : 1990   ID: N/A   Primary Care Provider: No primary provider on file.       Date form completed: No visit date found.       Primary Ethnic Culture:  American   Primary Language:   English     needed? No Language: English   Name of preferred :   Phone #:   Preferred Mode of Communication: TBD   Preferred Method of Communication: TBD   Health Care Home Complexity Tier:         Contact Information:   Mother's Name: Suma Red   Father's Name: Data Unavailable   Phone: [phone] (home)    Preferred Contact   Address:   [address] [place] [address]  [place]   Siblings/Relatives:   Lives with:      My Story  I like to be called Jeffery  Health Maintenance/Preventative Procedures and Immunizations are addressed in the Health  Maintenance List and should be updated  Integrated Medical Plan    Additional Standing Lab Orders (Use Health Maintenance When Possible):      TBD    Therapies:  N/A    My Multidisciplinary Care Team Members  Specialty of Care Team Member Name, Clinic, Address, Phone #, Fax #, E-mail   Primary care provider: No primary provider on file.                          School:  N/A    Care Givers  Type of Care Giver Phone/Fax E-mail   PCA:        Home Health Agency:       Nurse Name:       :       Guardian:         Persons You Can Contact About Me and My Medical Care  Name Relation Phone/Fax E-mail DARIEN Date                                                     This care plan is a documentation of the individual s care as directed by the family, educators, therapists and diverse medical providers.  Contributors to the care plan include the patient, the patient s family and No primary provider on file. and the health care home team at [facility].  Please indicate any changes made to the care of this patient on this care plan and fax it to the care coordinator above.  Thank you for your attention.  Patient: Jeffery Red__________________  Parent:N/A______________________  No primary provider on file.___________________  May 1, 2012___________________      Enid Branch RN   Care Coordinator  [phone]  DX V65.8 REPLACED WITH [id number] [facility]ALTH CARE HOME (04/08/2013)       Violation of controlled substance agreement 03/01/2017     Priority: Medium     [facility] meds and [employer]  drugs       Anxiety 03/16/2016     Priority: Medium     Attention deficit hyperactivity disorder (ADHD), predominantly inattentive type 02/18/2016     Priority: Medium     CARDIOVASCULAR SCREENING; LDL GOAL LESS THAN 130 09/04/2012     Priority: Medium     Metacarpal bone fracture 11/03/2011     Priority: Medium     Right third metacarpal          Past Medical History:    Past Medical History:   Diagnosis  Date     ADHD (attention deficit hyperactivity disorder)      Boxer's fracture      Metacarpal bone fracture 11/3/2011       Past Surgical History:    Past Surgical History:   Procedure Laterality Date     IRRIGATION AND DEBRIDEMENT FINGER, COMBINED  11/4/2011    Procedure:COMBINED IRRIGATION AND DEBRIDEMENT FINGER; right middle finger irrigation and debridement; Surgeon:PHONG BRODERICK; Location:[facility] OR       Family History:    No family history on file.    Social History:  Marital Status:  Single [1]  Social History     Tobacco Use     Smoking status: Current Every Day Smoker     Packs/day: 0.75     Smokeless tobacco: Never Used   Substance Use Topics     Alcohol use: No     Drug use: No     Comment: Sober date 7/5/ 2014        Medications:    predniSONE (DELTASONE) 10 MG tablet  triamcinolone (KENALOG) 0.1 % external cream  Acetaminophen (TYLENOL PO)  atomoxetine (STRATTERA) 40 MG capsule  guaiFENesin-codeine (ROBITUSSIN AC) 100-10 MG/5ML solution  IBUPROFEN PO          Review of Systems   All other systems reviewed and are negative.      Physical Exam   BP: (!) 151/81  Pulse: 95  Temp: 97.7  F (36.5  C)  Resp: 20  Weight: 100 kg (220 lb 6.4 oz)  SpO2: 97 %      Physical Exam  Constitutional:       General: He is not in acute distress.     Appearance: He is not diaphoretic.   HENT:      Head: Atraumatic.   Eyes:      General: No scleral icterus.     Extraocular Movements: Extraocular movements intact.      Pupils: Pupils are equal, round, and reactive to light.   Neck:      Musculoskeletal: Normal range of motion.   Cardiovascular:      Rate and Rhythm: Normal rate.   Pulmonary:      Effort: No respiratory distress.   Musculoskeletal:         General: No tenderness.   Skin:     Comments: Multiple groups of linear oriented inflammatory papules and vesicles with some drainage on the bilateral forearms noted.  He has rash on his bilateral forearms, anterior abdomen, and bilateral upper thigh area.  No sign of  induration or fluctuance.  No sign of infection.   Neurological:      General: No focal deficit present.      Mental Status: He is alert.   Psychiatric:         Mood and Affect: Mood normal.         Behavior: Behavior normal.         Thought Content: Thought content normal.         Judgment: Judgment normal.         ED Course        Procedures               Critical Care time:  none               No results found for this or any previous visit (from the past 24 hour(s)).    Medications - No data to display    Assessments & Plan (with Medical Decision Making)  Contact dermatitis due to poison ivy     Jeffery Red is a 30 year old male who presents to the emergency department for poison ivy reaction that started a couple of days ago and has now spread throughout his body. All vitals are within normal limits. Physical exam reveals typical poison ivy findings of linear oriented inflammatory papules and vesicles.  No sign of infection.. Diagnosis is poison ivy reaction. Prescribed prednisone oral tapered dose and steroid topical cream.  Discussed the diagnosis and treatment plan with the patient. Instructed him to return to the ED with worsening symptoms.       I have reviewed the nursing notes.    I have reviewed the findings, diagnosis, plan and need for follow up with the patient.       Discharge Medication List as of 8/28/2020  2:31 PM      START taking these medications    Details   predniSONE (DELTASONE) 10 MG tablet 5 tabs PO QD x 2 days, then 4 tab QD x 2 d, then 3 tab QD x 2 d, then 2 tab QD x 2 d, then 1 tab QD x 2 d, then 1/2 tab QD x 2 days., Disp-31 tablet,R-0, E-Prescribe      triamcinolone (KENALOG) 0.1 % external cream Apply topically 2 times dailyDisp-80 g,[id number]             Final diagnoses:   Contact dermatitis due to poison ivy   This document serves as a record of services personally performed by Ilan Bridges PA*. It was created on their behalf by Mildred Monreal, a trained medical  angela. The creation of this record is based on the provider's personal observations and the statements of the patient. This document has been checked and approved by the attending provider.  Note: Chart documentation done in part with Dragon Voice Recognition software. Although reviewed after completion, some word and grammatical errors may remain.    8/28/2020   Ilan Bridges PA-C   [facility] EMERGENCY DEPARTMENT     Ilan Bridges PA-C  08/28/20 1442

## 2020-08-28 NOTE — ED AVS SNAPSHOT
[facility] Emergency Department  [address] [place] [address]  Phone:  [phone]  Fax:  [phone]                                    Jeffery Red   MRN: [id number]    Department:  [facility] Emergency Department   Date of Visit:  8/28/2020           After Visit Summary Signature Page    I have received my discharge instructions, and my questions have been answered. I have discussed any challenges I see with this plan with the nurse or doctor.    ..........................................................................................................................................  Patient/Patient Representative Signature      ..........................................................................................................................................  Patient Representative Print Name and Relationship to Patient    ..................................................               ................................................  Date                                   Time    ..........................................................................................................................................  Reviewed by Signature/Title    ...................................................              ..............................................  Date                                               Time          22EPIC Rev 08/18

## 2022-10-20 ENCOUNTER — NURSE TRIAGE (OUTPATIENT)
Dept: FAMILY MEDICINE | Facility: CLINIC | Age: 32
End: 2022-10-20

## 2022-10-20 NOTE — TELEPHONE ENCOUNTER
Mom calling in with patient present on speaker phone. Patient stated last week he has some pain when urinating and some blood in urine but that went away. Now patient states the symptoms are back. Patient only has pain when urinating and states urine is sometimes brown in color. Patient is still able to urinate but states he is only able to go for about 4 seconds then stream stops and he has to start again. Patient states he is dehydrated though and does not drink enough water. Patient feels maybe there is like a stone present that is blocking flow.     RN advised them per protocol he should be seen within 2 weeks. However, RN, patient and mom all agreed it might be best to have him seen sooner than later. RN offered to look for an appointment next week but they were going to look into going to an UC/ED. RN reviewed care advice and instructed patient to increase water intake.     RN reviewed red flag symptoms with patient and when to see emergency care. Patient agreed and understood.     DONAVAN EatonN, RN         Reason for Disposition    Urination is difficult to start (i.e., hesitancy) or straining    Additional Information    Negative: Shock suspected (e.g., cold/pale/clammy skin, too weak to stand, low BP, rapid pulse)    Negative: Sounds like a life-threatening emergency to the triager    Negative: Followed a female genital area injury (e.g., vagina, vulva)    Negative: Followed a male genital area injury (penis, scrotum)    Negative: Vaginal discharge    Negative: Pus (white, yellow) or bloody discharge from end of penis    Negative: Pain or burning with passing urine (urination) and pregnant    Negative: Pain or burning with passing urine (urination) and female    Negative: Pain or burning with passing urine (urination) and male    Negative: Pain or itching in the vulvar area    Negative: Pain in scrotum is main symptom    Negative: Blood in the urine is main symptom    Negative: Symptoms arising  from use of a urinary catheter (e.g., coude, Bowman)    Negative: Decreased urination and drinking very little and dehydration suspected (e.g., dark urine, no urine > 12 hours, very dry mouth, very lightheaded)    Negative: Patient sounds very sick or weak to the triager    Negative: Fever > 100.4 F  (38.0 C)    Negative: Side (flank) or lower back pain present    Negative: Can't control passage of urine (i.e., urinary incontinence) and new-onset (< 2 weeks) or worsening    Negative: Urinating more frequently than usual (i.e., frequency)    Negative: Bad or foul-smelling urine    Negative: Patient wants to be seen    Protocols used: URINARY SYMPTOMS-A-[place]

## 2024-06-11 ENCOUNTER — HOSPITAL ENCOUNTER (EMERGENCY)
Facility: CLINIC | Age: 34
End: 2024-06-11
Payer: COMMERCIAL

## 2024-06-11 ENCOUNTER — OFFICE VISIT (OUTPATIENT)
Dept: BEHAVIORAL HEALTH | Facility: CLINIC | Age: 34
End: 2024-06-11
Payer: COMMERCIAL

## 2024-06-11 VITALS — HEART RATE: 105 BPM | SYSTOLIC BLOOD PRESSURE: 124 MMHG | DIASTOLIC BLOOD PRESSURE: 69 MMHG

## 2024-06-11 DIAGNOSIS — F17.200 TOBACCO USE DISORDER: ICD-10-CM

## 2024-06-11 DIAGNOSIS — F11.20 OPIOID USE DISORDER, SEVERE, DEPENDENCE (H): Primary | ICD-10-CM

## 2024-06-11 DIAGNOSIS — F15.90 STIMULANT USE DISORDER: ICD-10-CM

## 2024-06-11 DIAGNOSIS — Z51.81 ENCOUNTER FOR MONITORING OPIOID MAINTENANCE THERAPY: ICD-10-CM

## 2024-06-11 DIAGNOSIS — Z79.891 ENCOUNTER FOR MONITORING OPIOID MAINTENANCE THERAPY: ICD-10-CM

## 2024-06-11 LAB — CREAT UR-MCNC: 207 MG/DL

## 2024-06-11 PROCEDURE — 99204 OFFICE O/P NEW MOD 45 MIN: CPT | Performed by: FAMILY MEDICINE

## 2024-06-11 PROCEDURE — G0480 DRUG TEST DEF 1-7 CLASSES: HCPCS | Performed by: FAMILY MEDICINE

## 2024-06-11 RX ORDER — BUPRENORPHINE HYDROCHLORIDE AND NALOXONE HYDROCHLORIDE DIHYDRATE 8; 2 MG/1; MG/1
1 TABLET SUBLINGUAL 2 TIMES DAILY
Qty: 14 TABLET | Refills: 0 | Status: SHIPPED | OUTPATIENT
Start: 2024-06-11

## 2024-06-11 RX ORDER — POLYETHYLENE GLYCOL 3350 17 G/17G
1 POWDER, FOR SOLUTION ORAL DAILY
Qty: 578 G | Refills: 11 | Status: SHIPPED | OUTPATIENT
Start: 2024-06-11

## 2024-06-11 ASSESSMENT — COLUMBIA-SUICIDE SEVERITY RATING SCALE - C-SSRS
6. HAVE YOU EVER DONE ANYTHING, STARTED TO DO ANYTHING, OR PREPARED TO DO ANYTHING TO END YOUR LIFE?: NO
1. IN THE PAST MONTH, HAVE YOU WISHED YOU WERE DEAD OR WISHED YOU COULD GO TO SLEEP AND NOT WAKE UP?: NO
2. HAVE YOU ACTUALLY HAD ANY THOUGHTS OF KILLING YOURSELF?: NO

## 2024-06-11 ASSESSMENT — PATIENT HEALTH QUESTIONNAIRE - PHQ9: SUM OF ALL RESPONSES TO PHQ QUESTIONS 1-9: 2

## 2024-06-11 NOTE — PROGRESS NOTES
[facility] Initial Visit    ASSESSMENT/PLAN                                                      1. Opioid use disorder, severe, dependence (H)  34 yr old male with ~10 yr h/o inhaled fentanyl use, last use 4/2024.  Has been taking buprenorphine 16mg/day for the last ~5 weeks mainly from non-rx source.  Started [facility] recently.  Reporting control of symptoms.  Plans to establish with [facility] in a few days.   Continue buprenorphine 16mg/day  Naloxone prescribed  Miralax prn OIC  Continue programming at [facility]  - Drugs of Abuse Screen Urine (POC CUPS) POCT; Standing  - Drug Confirmation Panel Urine with Creat - lab collect; Future  - polyethylene glycol (MIRALAX) 17 GM/Dose powder; Take 17 g (1 Capful) by mouth daily  Dispense: 578 g; Refill: 11  - naloxone (NARCAN) 4 MG/0.1ML nasal spray; Spray 1 spray (4 mg) into one nostril alternating nostrils as needed for opioid reversal every 2-3 minutes until assistance arrives  Dispense: 0.2 mL; Refill: 11  - buprenorphine-naloxone (SUBOXONE) 8-2 MG SUBL sublingual tablet; Place 1 tablet under the tongue 2 times daily  Dispense: 14 tablet; Refill: 0  - Drug Confirmation Panel Urine with Creat - lab collect    2. Encounter for monitoring opioid maintenance therapy  - Drugs of Abuse Screen Urine (POC CUPS) POCT; Standing  - Drug Confirmation Panel Urine with Creat - lab collect; Future  - Drug Confirmation Panel Urine with Creat - lab collect    3. Stimulant use disorder  Continue programming at [facility]    4. Tobacco use disorder  Evaluate pt's goals at follow-up     Return in about 1 week (around 6/18/2024).if any problems establishing care with [facility]      Patient counseling completed today:  Discussed mechanism of action, potential risks/benefits/side effects of medications and other recommendations above.    Discussed risk of precipitated withdrawal with initiation of buprenorphine in the presence of full opioid agonists.    Reviewed  directions for initiation of buprenorphine to reduce risk of precipitated withdrawal and maximize efficacy.    Harm reduction counseling including never use alone, availability of naloxone, avoiding combination of opioids with benzodiazepines, alcohol, or other sedatives, safer administration.      Discussed importance of avoiding isolation, building a network of supportive relationships, avoiding people/places/things associated with past use to reduce risk of relapse; including motivational interviewing regarding psychosocial treatment for addiction.     SUBJECTIVE                                                      CC/HPI:  Jeffery Red is a 34 year old male with PMH ADHD, nicotine use disorder, stimulant use disorder, and opioid use disorder on buprenorphine who [facility] to the [facility] for initial visit.      Brief History:  Jeffery Red was first seen in Recovery [facility] on 06/11/24. They were referred by staff at [facility] for pt to continue buprenorphine.  Pt has been taking buprenorphine 16mg/day from rx and non-rx sources since he started programming near [place] in 5/2024.  He has an appointment in a few days with [facility] and plans to continue care with them.   Reports symptoms have been controlled with current dose of buprenorphine.  Experiencing mild constipation as side effect.      Substance Use History :  Opioids:   Age at first use: early 20's  Current use: substance: fentanyl;  route: inhaled ; timing of last use: 4/26/24      IV drug use: No   History of overdose: Yes: 4/26/24  Previous residential or outpatient treatments for addiction : Yes:   Previous medication treatments for addiction: No  Longest period of sobriety: 4/26/24 to present  Medical complications related to substance use: overdose  Hepatitis C: declined testing 6/11/24  HIV: declined testing 6/11/24    DSM-5 OUD criteria met:  Taken in larger amounts/greater time spent in behavior over longer period of time than  intended  Persistent desire or unsuccessful efforts to cut down or control use/behavior  A great deal of time is spent in activities necessary to obtain the substance/participate in the behavior or recover from its effects  Cravings  Recurrent use/behavior resulting in failure to fulfill major role obligations at work, school, or home  Continued use/behavior despite having persistent or recurrent social or interpersonal problems caused or exacerbated by effects of use/behavior  Important social, occupational, or recreational activities are given up or reduced because of use/behavior  Tolerance   Withdrawal    Other Addiction History:  Stimulants   Methamphetamine, inhaled  Sedatives/hypnotics/anxiolytics:   denies  Alcohol:   occasional  Nicotine:   vape  Cannabis:   occasional  Hallucinogens/Dissociatives: (acid, mushrooms, ketamine)  None recently  Eating disorder:  no  Gambling:   no        [place] Prescription Drug Monitoring Program Reviewed:  Yes;   05/03/2024 05/03/2024 1 Suboxone 8 Mg-2 Mg Sl Film 10.00 5 Keila Hen [id number] Ess ([id number]) 0/0 16.00 mg Comm Ins [place]   05/01/2024 05/01/2024 1 Suboxone 2 Mg-0.5 Mg Sl Film 3.00 3 Er Browning [id number] Ess ([id number]) 0/0 2.00 mg [place]   05/01/2024 05/01/2024 1 Suboxone 4 Mg-1 Mg Sl Film 3.00 3 Er Browning [id number] Ess ([id number]) 0/0 4.00 mg [place]       PAST PSYCHIATRIC HISTORY:  Diagnoses- ADHD  Suicide Attempts: No   Hospitalizations: No         6/11/2024    11:00 AM   PHQ   PHQ-9 Total Score 2   Q9: Thoughts of better off dead/self-harm past 2 weeks Not at all         Social History  Housing status: in a sober house  Employment status: Unemployed, not seeking work  Relationship status: Single  Children: 5 children - 2 girls and 3 boys, live with their mothers          Medications:  Current Outpatient Medications   Medication Sig Dispense Refill    buprenorphine-naloxone (SUBOXONE) 8-2 MG SUBL sublingual tablet Place 1 tablet under the tongue 2 times daily 14 tablet 0     naloxone (NARCAN) 4 MG/0.1ML nasal spray Spray 1 spray (4 mg) into one nostril alternating nostrils as needed for opioid reversal every 2-3 minutes until assistance arrives 0.2 mL 11    polyethylene glycol (MIRALAX) 17 GM/Dose powder Take 17 g (1 Capful) by mouth daily 578 g 11     No current facility-administered medications for this visit.       Allergies   Allergen Reactions    Hydromorphone Hives     Facial swelling    Dilaudid [Hydromorphone Hcl]      Symptoms: itching, hives (all 4 extremities), swelling on face and under tongue. Occurred right after Dilaudid IV given-      probable histamine reaction due to narcotic per pharmacist (pt was given Zantac and Benadryl with resolution of symptoms in 10 min. Pharmacist reports not a true allergy with such a quick resolution of the symptoms after treated with Zantac and Benedryl)    Vancomycin Hives     Facial swelling       Past Medical History:   Diagnosis Date    ADHD (attention deficit hyperactivity disorder)     Boxer's fracture     right 5th MC - pt did not follow up but it healed and he has good function    Metacarpal bone fracture 11/3/2011       Past Surgical History:   Procedure Laterality Date    IRRIGATION AND DEBRIDEMENT FINGER, COMBINED  11/4/2011    Procedure:COMBINED IRRIGATION AND DEBRIDEMENT FINGER; right middle finger irrigation and debridement; Surgeon:PHONG BRODERICK; Location: OR       No family history on file.      REVIEW OF SYSTEMS:  See HPI  No withdrawal symptoms    OBJECTIVE                                                    /69   Pulse 105     Physical Exam  Constitutional:       General: He is not in acute distress.  HENT:      Head: Normocephalic and atraumatic.   Eyes:      Extraocular Movements: Extraocular movements intact.      Conjunctiva/sclera: Conjunctivae normal.   Pulmonary:      Effort: Pulmonary effort is normal.   Neurological:      Mental Status: He is alert and oriented to person, place, and time.       "Motor: Motor function is intact.      Coordination: Coordination is intact.   Psychiatric:         Attention and Perception: Attention and perception normal.         Mood and Affect: Mood and affect normal.         Speech: Speech normal.         Behavior: Behavior is cooperative.         Thought Content: Thought content normal.      Comments: Insight and judgement are good         Labs:    UDS: 6/11/24 +buprenorphine, +THC  No results found for: \"BUP\", \"BZO\", \"BAR\", \"AUBRIE\", \"MAMP\", \"AMP\", \"MDMA\", \"MTD\", \"CNT279\", \"OXY\", \"PCP\", \"THC\", \"TEMP\", \"SGPOCT\"    *POC urine drug screen does not screen for Fentanyl    Recent Results (from the past 720 hour(s))   Urine Creatinine for Drug Screen Panel    Collection Time: 06/11/24  2:28 PM   Result Value Ref Range    Creatinine Urine for Drug Screen 207 mg/dL               At least 45 min spent on day of encounter in review of medical record,  review, obtaining histories, discussing recommendations, counseling, providing support.      Kimberley Liu MD  Addiction Medicine  [facility]  [address] [place] [address] [phone]          "

## 2024-06-18 LAB
BUPRENORPHINE UR CFM-MCNC: 1296 NG/ML
BUPRENORPHINE/CREAT UR: 626 NG/MG {CREAT}
NALOXONE UR CFM-MCNC: 1314 NG/ML
NALOXONE: 635 NG/MG {CREAT}
NORBUPRENORPHINE UR CFM-MCNC: 1062 NG/ML
NORBUPRENORPHINE/CREAT UR: 513 NG/MG {CREAT}